# Patient Record
Sex: FEMALE | Race: WHITE | NOT HISPANIC OR LATINO | Employment: OTHER | URBAN - METROPOLITAN AREA
[De-identification: names, ages, dates, MRNs, and addresses within clinical notes are randomized per-mention and may not be internally consistent; named-entity substitution may affect disease eponyms.]

---

## 2017-07-13 ENCOUNTER — OFFICE VISIT (OUTPATIENT)
Dept: PALLIATIVE CARE | Facility: CLINIC | Age: 72
End: 2017-07-13

## 2017-07-13 ENCOUNTER — LAB (OUTPATIENT)
Dept: LAB | Facility: HOSPITAL | Age: 72
End: 2017-07-13

## 2017-07-13 VITALS
HEART RATE: 120 BPM | BODY MASS INDEX: 22.91 KG/M2 | WEIGHT: 146 LBS | DIASTOLIC BLOOD PRESSURE: 69 MMHG | SYSTOLIC BLOOD PRESSURE: 113 MMHG | HEIGHT: 67 IN

## 2017-07-13 DIAGNOSIS — Z51.81 THERAPEUTIC DRUG MONITORING: ICD-10-CM

## 2017-07-13 DIAGNOSIS — G89.3 CANCER ASSOCIATED PAIN: ICD-10-CM

## 2017-07-13 DIAGNOSIS — B37.0 ORAL CANDIDIASIS: ICD-10-CM

## 2017-07-13 DIAGNOSIS — R51.9 HEADACHE DUE TO INTRACRANIAL DISEASE: ICD-10-CM

## 2017-07-13 DIAGNOSIS — G40.109 SIMPLE PARTIAL SEIZURES (HCC): ICD-10-CM

## 2017-07-13 DIAGNOSIS — R06.09 DYSPNEA ON EFFORT: ICD-10-CM

## 2017-07-13 DIAGNOSIS — R63.0 ANOREXIA: ICD-10-CM

## 2017-07-13 DIAGNOSIS — Z51.81 THERAPEUTIC DRUG MONITORING: Primary | ICD-10-CM

## 2017-07-13 DIAGNOSIS — R53.1 ASTHENIA DUE TO DISEASE: ICD-10-CM

## 2017-07-13 DIAGNOSIS — R53.83 FATIGUE, UNSPECIFIED TYPE: ICD-10-CM

## 2017-07-13 DIAGNOSIS — Z99.81 OXYGEN DEPENDENT: ICD-10-CM

## 2017-07-13 DIAGNOSIS — G93.9 HEADACHE DUE TO INTRACRANIAL DISEASE: ICD-10-CM

## 2017-07-13 DIAGNOSIS — C71.9 GLIOBLASTOMA MULTIFORME (HCC): Primary | ICD-10-CM

## 2017-07-13 DIAGNOSIS — F51.01 PRIMARY INSOMNIA: ICD-10-CM

## 2017-07-13 LAB
AMPHET+METHAMPHET UR QL: NEGATIVE
AMPHETAMINES UR QL: NEGATIVE
BARBITURATES UR QL SCN: NEGATIVE
BENZODIAZ UR QL SCN: POSITIVE
BUPRENORPHINE SERPL-MCNC: NEGATIVE NG/ML
CANNABINOIDS SERPL QL: POSITIVE
COCAINE UR QL: NEGATIVE
METHADONE UR QL SCN: NEGATIVE
OPIATES UR QL: POSITIVE
OXYCODONE UR QL SCN: POSITIVE
PCP UR QL SCN: NEGATIVE
PROPOXYPH UR QL: NEGATIVE
TRICYCLICS UR QL SCN: NEGATIVE

## 2017-07-13 PROCEDURE — 80306 DRUG TEST PRSMV INSTRMNT: CPT | Performed by: INTERNAL MEDICINE

## 2017-07-13 PROCEDURE — 99204 OFFICE O/P NEW MOD 45 MIN: CPT | Performed by: INTERNAL MEDICINE

## 2017-07-13 RX ORDER — OXYCODONE HYDROCHLORIDE 15 MG/1
20 TABLET ORAL EVERY 6 HOURS PRN
COMMUNITY
End: 2017-07-13

## 2017-07-13 RX ORDER — OXYCODONE HCL 20 MG/1
20 TABLET, FILM COATED, EXTENDED RELEASE ORAL EVERY 12 HOURS
COMMUNITY
End: 2017-07-13 | Stop reason: SDUPTHER

## 2017-07-13 RX ORDER — LORAZEPAM 1 MG/1
1 TABLET ORAL EVERY 8 HOURS PRN
Qty: 4 TABLET | Refills: 3 | Status: SHIPPED | OUTPATIENT
Start: 2017-07-13

## 2017-07-13 RX ORDER — OXYCODONE HYDROCHLORIDE 20 MG/1
20 TABLET ORAL 3 TIMES DAILY PRN
Qty: 75 TABLET | Refills: 0 | Status: SHIPPED | OUTPATIENT
Start: 2017-07-13 | End: 2017-08-07

## 2017-07-13 RX ORDER — FLUOXETINE HYDROCHLORIDE 20 MG/1
40 CAPSULE ORAL 2 TIMES DAILY
COMMUNITY

## 2017-07-13 RX ORDER — LORAZEPAM 1 MG/1
1 TABLET ORAL EVERY 8 HOURS PRN
COMMUNITY
End: 2017-07-13 | Stop reason: SDUPTHER

## 2017-07-13 RX ORDER — FAMOTIDINE 20 MG/1
20 TABLET, FILM COATED ORAL
COMMUNITY

## 2017-07-13 RX ORDER — MODAFINIL 100 MG/1
200 TABLET ORAL DAILY
COMMUNITY
End: 2017-08-10

## 2017-07-13 RX ORDER — DEXAMETHASONE 4 MG/1
4 TABLET ORAL 2 TIMES DAILY WITH MEALS
COMMUNITY

## 2017-07-13 RX ORDER — DRONABINOL 5 MG/1
5 CAPSULE ORAL
COMMUNITY

## 2017-07-13 RX ORDER — LEVETIRACETAM 500 MG/1
1000 TABLET ORAL 2 TIMES DAILY
COMMUNITY

## 2017-07-13 RX ORDER — OXYCODONE HCL 20 MG/1
20 TABLET, FILM COATED, EXTENDED RELEASE ORAL EVERY 8 HOURS SCHEDULED
Qty: 30 TABLET | Refills: 0 | Status: SHIPPED | OUTPATIENT
Start: 2017-07-28 | End: 2017-07-25 | Stop reason: SDUPTHER

## 2017-07-13 RX ORDER — OLMESARTAN MEDOXOMIL 20 MG/1
20 TABLET ORAL DAILY
COMMUNITY

## 2017-07-13 NOTE — PROGRESS NOTES
Subjective   Mine Burgos is a 72 y.o. female.     History of Present Illness   72yowf with glioblastoma multiforme (Dx July 2016) with h/o complications of intracerebral hemorrhage and PEs on lifelong Lovenox .  Had been under hospice care December 2016 when she had fatigue, anorexia, dehydration.  Slowly regained intake and function.  She was able to leave home and f/u with Dr. Us at [facility] neuro-oncology and decided to revoke hospice.    Current treatment plan:  Optune therapy.  MRI monitoring for disease progression.  May reconsider Avastin or clinical trials, per their understanding.  Dr. Us adjusting Decadron based on MRI monitoring of cerebral edema and clinical history.  Referred to community palliative home vs clinic for ongoing symptom management, appropriate for her changing levels of function and homebound state.  Actively participating with PT at this time.  EMS DNR in home from when she was on hospice.      Pain:  Continuous R frontotemporal stabbing headache.  Occasional MSK sharp pains of arms and legs with activity.  For past few weeks, has described left sided stabbing headache.      Function:  Oxycontin effective for HA, but describes requiring oxyIR routinely about 8 hours later.  Frequent night awakening and moaning while asleep due to to HA.  MSK discomfort not limiting her, but profound muscle weakness does.  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  Legs frequently buckle.  Significant LUE weakness and muscle loss and left hemineglect.  Wheelchair bound.  Working with PT to get up from seated position independently.  Currently requires maximum assistance with transfers. Son carries her upstairs.  Made a recent trip to [place] for a [employer] service.  Tolerated travel, but with profound fatigue on trip home and had x1 breakthrough seizure.  Has partial seizure affecting face, lasting 2-3 minutes, about every 2-3 weeks.  Last week, after trip, she had an episode of dizziness  and fell and hit elbow and head.  No LOC.    Asthenia:  Multifactorial.  Fatigue and lack of energy.  Myopathy and extremity weakness.  Poor sleep-wake cycle.  Had been on Adderall in past, but had intolerable anorexia.  Switched to Provigil.  On 200mg BID, she has no appetite.  Currently taking 200mg once daily.  Still with no energy nor focus.   has tried not giving it to her and noticed no change in her energy level.      Insomnia;  Life long chaotic sleeper.  In past few weeks, has had days of sleeping up to 20 hours.  But not the usual string of days of being more alert, like she had prior to malignancy.   noticing pattern of pt sleeping more.    Dyspnea:  Oxygen dependent.  No chest pain with PEs.  Dyspnea limits activity and conversation as well.  No severe dyspnea at rest on oxygen.    Anorexia:  Stable appetite with Dronabinol.  Weight actually increasing due to Decadron (increased last oncology appt from 2mg BID to 4mg BID)    The following portions of the patient's history were reviewed and updated as appropriate: allergies, current medications, past family history, past medical history, past social history, past surgical history and problem list.    Review of Systems  Otherwise negative except as below and as already detailed in HPI.    Last BM: denies constipation nor urinary retention    DAVID-7:  I reviewed. See scanned form.  + mild anxiety 9/21  PHQ-9:  I reviewed.  See flowsheet.  + depression 13/21.  But most near daily symptoms related to diagnosis.  However, several days of anhedonia and poor self image.    PPS/ESAS:  I reviewed.  See flowsheets.  Severe tired, drowsy, SOA.  Pain 6 (goal)    KPS: 40 - Disabled; requires special care and assistance     ECOG: (3) Capable of limited self-care, confined to bed or chair > 50% of waking hours    Opioid Risk Tool low.  1 (reactive depression)    UDS: positive for benzodiazepines, marijuana and opiate, oxycodone    XIOMARA/Medication Counts:   Reviewed.  See flowsheets and scanned forms.  Did not bring medication to initial appt.  XIOMARA without concerns.  Prescribers identified as psychiatry, oncology, radiation oncology, hospitalist, hospice.      Objective   Physical Exam   General:  Chronically ill, wearing Optune device, alopecia, sitting in wheelchair, NAD  Psych:  Pleasant, jokes at time.   does most of the speaking.  No slowing nor agitation  Neuro:  Awake and alert, moves 4 extremities in wheelchair.  No tremor noted.  HEENT:  Anicteric, + thick brown candida on tongue  PULM:  CTAB, normal effort, wearing oxygen, no distress.  Noted sighing respirations after speaking sentences  CV:  RRR no m/r/g  GI:  Dec BS, soft, ntnd  MSK:  Muscle atrophy LUE>RUE, no paraspinal spasm.  Mildly stooped posture, no spinal tenderness  Skin:  Thin, purpura c/w Lovenox    Sodium   Date Value Ref Range Status   11/22/2016 136 132 - 146 mmol/L Final     Potassium   Date Value Ref Range Status   11/22/2016 4.2 3.5 - 5.5 mmol/L Final     Chloride   Date Value Ref Range Status   11/22/2016 98 (L) 99 - 109 mmol/L Final     CO2   Date Value Ref Range Status   11/22/2016 31.0 20.0 - 31.0 mmol/L Final     BUN   Date Value Ref Range Status   11/22/2016 11 9 - 23 mg/dL Final     Creatinine   Date Value Ref Range Status   11/22/2016 0.60 0.60 - 1.30 mg/dL Final     Glucose   Date Value Ref Range Status   11/22/2016 157 (H) 70 - 100 mg/dL Final     Calcium   Date Value Ref Range Status   11/22/2016 8.6 (L) 8.7 - 10.4 mg/dL Final       Lab Results   Component Value Date    BUPRENORSCNU Negative 07/13/2017    PROPOXSCN Negative 07/13/2017    OXYCODONESCN Positive (A) 07/13/2017    BARBITSCNUR Negative 07/13/2017    LABMETHSCN Negative 07/13/2017    TRICYCLICSCN Negative 07/13/2017    LABBENZSCN Positive (A) 07/13/2017    AMPHETSCREEN Negative 07/13/2017    LABOPIASCN Positive (A) 07/13/2017    METAMPSCNUR Negative 07/13/2017    COCAINEUR Negative 07/13/2017    PCPUR  Negative 07/13/2017    THCURSCR Positive (A) 07/13/2017       Assessment/Plan   Mine was seen today for pain, drowsy, poor appetite and shortness of breath.    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  -     oxyCODONE (ROXICODONE) 20 MG tablet; Take 1 tablet by mouth 3 (Three) Times a Day As Needed for Severe Pain  for up to 25 days.  -     oxyCODONE ER (oxyCONTIN) 20 MG 12 hr tablet; Take 1 tablet by mouth Every 8 (Eight) Hours for 10 days.  -     nystatin (MYCOSTATIN) 138391 UNIT/ML suspension; Swish and swallow 5 mL 4 (Four) Times a Day for 10 days. As needed for thrush        Patient was counseled on narcotic safety and patient responsibility of medication against theft or stolen medications.  No early refills requests will be honored for lost or stolen medication.  Patient was counseled to take medications only as prescribed or instructed by prescribing physician.  Patient was counseled regarding risk of overdose and polypharmacy.  Patient was advised against using alcohol or driving while on narcotic medication.  Patient was advised of opioid side effects of constipation and potential altered sensorium.  Advised of long term effects of sleep apnea, hypogonadism and fatigue, osteoporosis, risk of abuse and addiction.  Advised to be supervised for first few days while on new medication or dosages.    Discussion:    Pt with end dose failure of oxycontin, which is effective for cancer related HA.  Schedule Oxycontin to q8h.      Will check CBC, TSH, Vitamin B12 and 25-hydroxy vitamin D levels, as deficiencies may be  contributing to fatigue.  Will send to [facility] in [place] for continuity.    Follow up in 1 month.    They have phone number for [facility] if she becomes homebound and requires home palliative visit.

## 2017-07-13 NOTE — PATIENT INSTRUCTIONS
1.  Will check CBC, TSH, vitamin B12 and Vitamin D levels    ALWAYS bring ALL of your medications prescribed by this clinic to every appointment.    Call [phone] for questions regarding medications, refills, or plan of care on Mondays - Fridays 9am to 4pm.      You must call AT LEAST one week in advance for any new medication or refill requests. Clinic days are Tuesday and Thursdays at this time.  Prescriptions for controlled medications will be completed on clinic days only.    Call after hours and weekends only for acute (not chronic) symptom issues to speak to on-call physician or nurse practitioner.  Be advised that any requests for new prescriptions for controlled substances can NOT be honored after hours.    Call [phone] only for scheduling issues.

## 2017-07-13 NOTE — PROGRESS NOTES
Pt seen by home APRN at end of June and determined not to be home bound. Pt and her spouse seen with Dr. Shaffer today. Pt having headache pain. Medication regimen discussed. Spouse is very organized, and attentive to symptoms and management. Pt had hospice at the end of last year when she was not doing well. Couple discontinued hospice when oncologist wanted to do regular three month mri to manage steroids due to swelling. They are aware of what hospice offers but now continue to see oncologist and are open to ongoing management with oncologist.

## 2017-07-25 ENCOUNTER — TELEPHONE (OUTPATIENT)
Dept: PALLIATIVE CARE | Facility: CLINIC | Age: 72
End: 2017-07-25

## 2017-07-25 DIAGNOSIS — G93.9 HEADACHE DUE TO INTRACRANIAL DISEASE: Primary | ICD-10-CM

## 2017-07-25 DIAGNOSIS — R51.9 HEADACHE DUE TO INTRACRANIAL DISEASE: Primary | ICD-10-CM

## 2017-07-25 DIAGNOSIS — C71.9 GLIOBLASTOMA MULTIFORME (HCC): ICD-10-CM

## 2017-07-25 RX ORDER — OXYCODONE HCL 20 MG/1
20 TABLET, FILM COATED, EXTENDED RELEASE ORAL EVERY 8 HOURS SCHEDULED
Qty: 90 TABLET | Refills: 0 | Status: SHIPPED | OUTPATIENT
Start: 2017-07-28

## 2017-07-25 NOTE — TELEPHONE ENCOUNTER
Contacted pts  after receiving a rejection for pt's oxycontin. Spoke to El. He states he attempted to fill the medication to early. Reviewed previous CBC results. Dr. Shaffer had reviewed. No change to treatment plan.

## 2017-08-10 ENCOUNTER — OFFICE VISIT (OUTPATIENT)
Dept: PALLIATIVE CARE | Facility: CLINIC | Age: 72
End: 2017-08-10

## 2017-08-10 VITALS
DIASTOLIC BLOOD PRESSURE: 75 MMHG | BODY MASS INDEX: 22.71 KG/M2 | WEIGHT: 145 LBS | HEART RATE: 112 BPM | SYSTOLIC BLOOD PRESSURE: 130 MMHG

## 2017-08-10 DIAGNOSIS — R51.9 HEADACHE DUE TO INTRACRANIAL DISEASE: Primary | ICD-10-CM

## 2017-08-10 DIAGNOSIS — Z99.81 OXYGEN DEPENDENT: ICD-10-CM

## 2017-08-10 DIAGNOSIS — R63.0 ANOREXIA: ICD-10-CM

## 2017-08-10 DIAGNOSIS — R53.1 ASTHENIA DUE TO DISEASE: ICD-10-CM

## 2017-08-10 DIAGNOSIS — G93.9 HEADACHE DUE TO INTRACRANIAL DISEASE: Primary | ICD-10-CM

## 2017-08-10 DIAGNOSIS — R06.09 DYSPNEA ON EFFORT: ICD-10-CM

## 2017-08-10 DIAGNOSIS — M79.18 MUSCULOSKELETAL PAIN: ICD-10-CM

## 2017-08-10 PROCEDURE — 99214 OFFICE O/P EST MOD 30 MIN: CPT | Performed by: INTERNAL MEDICINE

## 2017-08-10 RX ORDER — OXYCODONE HYDROCHLORIDE 20 MG/1
20 TABLET ORAL EVERY 6 HOURS PRN
COMMUNITY
End: 2017-08-10 | Stop reason: SDUPTHER

## 2017-08-10 RX ORDER — OXYCODONE HYDROCHLORIDE 30 MG/1
30 TABLET, FILM COATED, EXTENDED RELEASE ORAL DAILY
Qty: 30 TABLET | Refills: 0 | Status: SHIPPED | OUTPATIENT
Start: 2017-08-10

## 2017-08-10 RX ORDER — OXYCODONE HYDROCHLORIDE 20 MG/1
20 TABLET ORAL EVERY 6 HOURS PRN
Qty: 60 TABLET | Refills: 0 | Status: SHIPPED | OUTPATIENT
Start: 2017-08-10

## 2017-08-10 NOTE — PATIENT INSTRUCTIONS
1.  Goal 2 L fluids daily  2.  Passive range of motion and repositioning (pillow between knees) twice daily up to 10minutes a day.  3.  Tylenol, ice, heat as needed for musculoskeletal pain    ALWAYS bring ALL of your medications prescribed by this clinic to every appointment.    Call [phone] for questions regarding medications, refills, or plan of care (? Home visits?) on Mondays - Fridays 9am to 4pm.      You must call AT LEAST one week in advance for any new medication or refill requests. Clinic days are Tuesday and Thursdays at this time.  Prescriptions for controlled medications will be completed on clinic days only.    Call after hours and weekends only for acute (not chronic) symptom issues to speak to on-call physician or nurse practitioner.  Be advised that any requests for new prescriptions for controlled substances can NOT be honored after hours.    Call [phone] only for scheduling issues.

## 2017-08-10 NOTE — PROGRESS NOTES
Subjective   Mine Burgos is a 72 y.o. female.     History of Present Illness   72yowf with glioblastoma multiforme (Dx July 2016) with h/o complications of intracerebral hemorrhage and PEs on lifelong Lovenox .  Had been under hospice care December 2016 when she had fatigue, anorexia, dehydration.  Slowly regained intake and function.  She was able to leave home and f/u with Dr. Us at [facility] neuro-oncology and decided to revoke hospice.     Current treatment plan:  Optune therapy.  MRI monitoring for disease progression.  May reconsider Avastin or clinical trials, per their understanding.  Dr. Us adjusting Decadron based on MRI monitoring of cerebral edema and clinical history.  Referred to community palliative home vs clinic for ongoing symptom management, appropriate for her changing levels of function and homebound state.  EMS DNR in home from when she was on hospice.    Pain:  Uncontrolled headache R frontotemporal stabbing continuous headache.  Admits she does not endorse pain to family as often as she could.  Average 1-2 OxyIR 20mg daily, usually midday.  Taking Oxycontin 20mg at 7am, 3pm, 11pm.  So, taking average 60-80mg daily.  No night awakening from pain    Function:  Lower energy levels.  Sleeping more.  Had Keppra increased due to increase seizure activity ([id number] vs [id number]).  With increase Keppra,  noted direct correlation to increase drowsiness, visual hallucinations, and dysarthria, so he went back to prior dose, and this improved.  Last seizure one week ago.  They only last 3 minutes at most.  Drowsiness afterwards even without Ativan.  With spending more time in bed, noting MSK pain low back and neck.  Increase in PHAM with less exertion, just from bed to toilet, without hypoxia (stays mid 90s).  Tapered off Modafanil, but did not correlate change in energy at time this was discontinued.   did NOT taper Decadron, as many medication changes initiated last month between  palliative and oncology.  Still getting home PT twice weekly.    Goals:  They want to try Boswellia.  Still desire f/u with [facility]agen.  Next appt next week.  Open to further chemotherapy.    Denies constipation, no straining, daily BM.  Poor appetite.  Had one spontaneous sharp mid chest pain.  Increase in neck pain with sitting and holding head up.        The following portions of the patient's history were reviewed and updated as appropriate: allergies, current medications, past family history, past medical history, past social history, past surgical history and problem list.    Review of Systems  Otherwise negative except as below and as already detailed in HPI.    Last BM: daily    DAVID-7:  I reviewed. See scanned form.  Mild 8/21  PHQ-9:  I reviewed.  See flowsheet.  Moderate 16/27 but mainly somatic symptoms explained by GBM    PPS/ESAS:  I reviewed.  See flowsheets.  Pain, tired, depression, drowsy all 8.  SOA, appetite, wellbeing 7.  No anxiety.  No nausea    KPS: 40 - Disabled; requires special care and assistance     ECOG: (3) Capable of limited self-care, confined to bed or chair > 50% of waking hours    Opioid Risk Tool low    XIOMARA/Medication Counts:  Reviewed.  See flowsheets and scanned forms.  No outside prescribers.  No overuse.  Just filled Oxycontin today #91, #34 OxyIR 20mg, average 2-3/day over past 20 days.    Objective   Physical Exam   General:  Chronically ill, in wheelchair, leaning head back on  due to tired neck  HEENT:  EOMI, conjugate gaze, Optune in place, lips dry, neck supple, mild bilateral trapezius tension without tenderness to pressure (feels good)  PULM;  CTAB, normal effort, no w/r/r  CV;  RRR no m/r/g  Gi:  +BS, soft, ntnd  MSK:  Muscle atrophy, BUE strength 4/5, hand  3+/5  Skin:  Dry,  Left anterior skin tear dressing noted (RN cleaned and changed afterwards), no surrounding redness nor swelling noted  Neuro:  Tired, normal, speech, does not drift off nor close eyes  during conversation, no resting tremor, normal reaction time noted    Sodium   Date Value Ref Range Status   11/22/2016 136 132 - 146 mmol/L Final     Potassium   Date Value Ref Range Status   11/22/2016 4.2 3.5 - 5.5 mmol/L Final     Chloride   Date Value Ref Range Status   11/22/2016 98 (L) 99 - 109 mmol/L Final     CO2   Date Value Ref Range Status   11/22/2016 31.0 20.0 - 31.0 mmol/L Final     BUN   Date Value Ref Range Status   11/22/2016 11 9 - 23 mg/dL Final     Creatinine   Date Value Ref Range Status   11/22/2016 0.60 0.60 - 1.30 mg/dL Final     Glucose   Date Value Ref Range Status   11/22/2016 157 (H) 70 - 100 mg/dL Final     Calcium   Date Value Ref Range Status   11/22/2016 8.6 (L) 8.7 - 10.4 mg/dL Final       Lab Results   Component Value Date    BUPRENORSCNU Negative 07/13/2017    PROPOXSCN Negative 07/13/2017    OXYCODONESCN Positive (A) 07/13/2017    BARBITSCNUR Negative 07/13/2017    LABMETHSCN Negative 07/13/2017    TRICYCLICSCN Negative 07/13/2017    LABBENZSCN Positive (A) 07/13/2017    AMPHETSCREEN Negative 07/13/2017    LABOPIASCN Positive (A) 07/13/2017    METAMPSCNUR Negative 07/13/2017    COCAINEUR Negative 07/13/2017    PCPUR Negative 07/13/2017    THCURSCR Positive (A) 07/13/2017       Assessment/Plan   Mine was seen today for pain, fatigue, depression, drowsiness, poor appetite and shortness of breath.    Diagnoses and all orders for this visit:  Glioblastoma Multiforme    Headache due to intracranial disease  -     OxyCODONE HCl ER (oxyCONTIN) 30 MG tablet extended-release 12 hour; Take 1 tablet by mouth Daily. With Oxycontin 20 mg - to take 20mg - 30mg -20mg, dosing every 8 hours, total 70mg/day  -     oxyCODONE (ROXICODONE) 20 MG tablet; Take 1/2 to 1 tablet by mouth Every 6 (Six) Hours As Needed for severe Pain (4-6).  MSK pain  Dyspnea on effort, on oxygen  Simple partial seizures  Anorexia  Asthenia    Patient instructions:  1.  Goal 2 L fluids daily  2.  Passive range of motion  and repositioning (pillow between knees) twice daily up to 10minutes a day.  3.  Tylenol, ice, heat as needed for musculoskeletal pain    Discussion:  Educated on hydration, passive range of motion, proper positioning in bed as she has MSK pain due to immobility.  Discussed possibly restarting Modafanil, but they did not see that it was helping.  Will increase midday Oxycontin for midday uncontrolled headache from cancer.  Discussed concern of higher mortality with q8h Oxycontin dosing vs q12h dosing, however given her terminal diagnosis and palliative care goals, benefit outweigh risk.  She clearly is benefiting from q8hr dosing to control headache per .  However, overall there is increase in HA due to GBM progression.      We also discussed that when she transitions to hospice care, she will have to go back to [facility] as Oxycontin is not on formulary.  I think she has too much weight loss to rely on Fentanyl at this point.      I reviewed with them radiology report of MRI performed this week, but we agree that final interpretation by Dr. Us is warranted - noted new jugular thrombus, and progression of larger tumor and larger hematoma.    Discussed home palliative program.  As long as she is able to make it out of house to  Oncology appointments, she should be able to make Palliative appointments, according to CMS definitions.  If Oncology starts more telephone management due to homebound state, then we will transition her to home palliative visits (vs hospice, depending medical plan of care decided by Dr. Us).      Follow up next month.         8/11/17.  I spoke to  over phone to clarify that OxyIR 20mg should be cut in half to see if 10mg dose effective enough for breakthrough headache, especially due to concern of sedation.  He reports that he had no issues filling Oxycontin 30mg yesterday.  Does report she required PRN OxyIR 20mg dose this morning after morning Oxycontin dose.  He did ask  about refills as next available f/u appointment was just over 30 days away.  Reiterated palliative RN phone follow up re: homebound status and symptoms.